# Patient Record
Sex: MALE | Race: WHITE
[De-identification: names, ages, dates, MRNs, and addresses within clinical notes are randomized per-mention and may not be internally consistent; named-entity substitution may affect disease eponyms.]

---

## 2019-03-08 NOTE — HP
HISTORY AND PHYSICAL:

 

DATE OF ADMISSION:  03/11/19

 

DATE OF VISIT:  03/07/19

 

HISTORY OF PRESENT ILLNESS:  Olman is a healthy, active 33-year-old who made a jump at [employer] on 03/04/19.  He lost control of the right ski and broke his right ankle.  He was splinted at [facility] and comes here for followup.  He is unable to bear weight on the ankle.

 

Olman works as a  at a [employer] bar.  He is relatively healthy otherwise. He has had no previo
us problems with the ankle.  His workup today in the office shows him to have medial and lateral tend
erness and small shift of the mortise. So, the plan at this point will be an internal fixation of his
 right distal fibula.

 

He is 227 pounds.

 

PAST MEDICAL HISTORY:  He has no history of any medical illness including depression or anxiety.

 

CURRENT MEDICATIONS:  Olman is not on any active medications.

 

ALLERGIES:  He has no allergies to any medications.

 

SOCIAL HISTORY:  He smokes socially.  He does not drink heavily more than 4 to 5 drinks per week.

 

REVIEW OF SYSTEMS:  Negative for all 12 systems except for a sore throat, runny nose.

 

                               PHYSICAL EXAMINATION

 

GENERAL:  Olman is a healthy-appearing young male of relatively normal height and weight.  He is alert
.  No significant distress.

 

NECK:  He has a supple neck.

 

CHEST:  His chest exam is clear to auscultation in all lung fields.

 

CARDIAC:  Shows him to have prominent hear sounds.  Regular rate.  No extra sounds noted.

 

ABDOMEN:  Flat, nontender.  Lower GI deferred.

 

GENITOURINARY:  Deferred.

 

NEUROLOGICAL:  Deferred.

 

EXTREMITIES:  Show him to have significant edema at the lateral malleolus with some early ecchymosis.
  He has a one sensate foot.  He is able to dorsiflex to neutral. Tenderness is significant at the di
stal fibula, but not over the peroneus and there is tenderness at the medial deltoid.

 

 IMPRESSION AND PLAN:  The patient with shifted Yin B type ankle fracture with some evidence of del
toid disruption.  Plan at this point will be internal fixation of his right ankle.  The patient under
stands the postoperative course, the need to be nonweightbearing.  He has a possible situation with h
is girlfriend and brother to help him and the surgery will be scheduled in the near future.

 

 

 

[id number] #: [id number]

## 2019-03-11 ENCOUNTER — HOSPITAL ENCOUNTER (OUTPATIENT)
Dept: HOSPITAL 25 - OR | Age: 34
Discharge: HOME | End: 2019-03-11
Attending: ORTHOPAEDIC SURGERY
Payer: SELF-PAY

## 2019-03-11 VITALS — DIASTOLIC BLOOD PRESSURE: 81 MMHG | SYSTOLIC BLOOD PRESSURE: 123 MMHG
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
## 2019-03-11 DIAGNOSIS — S82.61XA: Primary | ICD-10-CM
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
## 2019-03-11 PROCEDURE — C1713 ANCHOR/SCREW BN/BN,TIS/BN: HCPCS

## 2019-03-11 PROCEDURE — 76000 FLUOROSCOPY <1 HR PHYS/QHP: CPT

## 2019-03-11 RX ADMIN — FENTANYL CITRATE PRN MCG: 0.05 INJECTION, SOLUTION INTRAMUSCULAR; INTRAVENOUS at 16:26

## 2019-03-11 RX ADMIN — OXYCODONE HYDROCHLORIDE AND ACETAMINOPHEN PRN TAB: 5; 325 TABLET ORAL at 16:22

## 2019-03-11 RX ADMIN — FENTANYL CITRATE PRN MCG: 0.05 INJECTION, SOLUTION INTRAMUSCULAR; INTRAVENOUS at 16:20

## 2019-03-11 RX ADMIN — FENTANYL CITRATE PRN MCG: 0.05 INJECTION, SOLUTION INTRAMUSCULAR; INTRAVENOUS at 16:31

## 2019-03-11 RX ADMIN — MORPHINE SULFATE PRN MG: 2 INJECTION, SOLUTION INTRAMUSCULAR; INTRAVENOUS at 16:44

## 2019-03-11 RX ADMIN — FENTANYL CITRATE PRN MCG: 0.05 INJECTION, SOLUTION INTRAMUSCULAR; INTRAVENOUS at 16:11

## 2019-03-11 RX ADMIN — MORPHINE SULFATE PRN MG: 2 INJECTION, SOLUTION INTRAMUSCULAR; INTRAVENOUS at 17:00

## 2019-03-11 RX ADMIN — FENTANYL CITRATE PRN MCG: 0.05 INJECTION, SOLUTION INTRAMUSCULAR; INTRAVENOUS at 16:13

## 2019-03-11 RX ADMIN — OXYCODONE HYDROCHLORIDE AND ACETAMINOPHEN PRN TAB: 5; 325 TABLET ORAL at 16:42

## 2019-04-23 NOTE — OP
DATE OF OPERATION:  03/11/19 - SDS

 

DATE OF BIRTH:  [birth year]

 

SURGEON:  Esvin Boucher MD

 

ASSISTANT:  Kemi Serrato PA-C

 

PRE-OP DIAGNOSIS:  Right ankle fracture.

 

POST-OP DIAGNOSIS:  Right ankle fracture.

 

OPERATIVE PROCEDURE:  Internal fixation, right distal fibula.

 

DESCRIPTION OF PROCEDURE:  The patient was taken to the operating room where 
lateral longitudinal incision was made over the distal fibula.  We isolated the 
oblique fracture, exposing the posterior and lateral aspect of the distal 
fibula. We lengthened the fibula using a crab claw clamp and then placed a pin 
temporarily longitudinally in the bone.  It was then fashioned to one third 
tubular plate along the posterolateral aspect of the fibula using back to front 
lag screw in the central portion as well as posterior to anterior cortical 
screws.  X-rays intraoperatively showed satisfactory position of the hardware, 
the bone and the ankle mortise.  We then irrigated thoroughly, closing with 2-0 
Vicryl, staples for the skin.  A compression dressing plaster splint applied.

 

 [id number]/CPS #: [id number]

[facility] DivisionSAILAJA